# Patient Record
Sex: FEMALE | Race: WHITE | Employment: FULL TIME | URBAN - METROPOLITAN AREA
[De-identification: names, ages, dates, MRNs, and addresses within clinical notes are randomized per-mention and may not be internally consistent; named-entity substitution may affect disease eponyms.]

---
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
## 2021-04-16 ENCOUNTER — HOSPITAL ENCOUNTER (EMERGENCY)
Age: 21
Discharge: HOME | End: 2021-04-17
Attending: EMERGENCY MEDICINE

## 2021-04-16 DIAGNOSIS — S61.412A LACERATION OF LEFT HAND WITHOUT FOREIGN BODY, INITIAL ENCOUNTER: Primary | ICD-10-CM

## 2021-04-16 PROCEDURE — 90471 IMMUNIZATION ADMIN: CPT

## 2021-04-16 PROCEDURE — 99282 EMERGENCY DEPT VISIT SF MDM: CPT

## 2021-04-16 PROCEDURE — 75810000293 HC SIMP/SUPERF WND  RPR

## 2021-04-16 NOTE — LETTER
[address] [address] EMERGENCY DEPT 
[address] [facility] 
[place] [id number] [phone] Work/School Note Date: 4/16/2021 To Whom It May concern: 
 
Mati Goddard was seen and treated today in the emergency room by the following provider(s): 
Attending Provider: Lynn Lopez DO. Mati Goddard is excused from work/school on 04/17/21 and 04/18/21. She is medically clear to return to work/school on 4/19/2021. Sincerely, Ashlyn Lugo RN

## 2021-04-17 ENCOUNTER — APPOINTMENT (OUTPATIENT)
Dept: GENERAL RADIOLOGY | Age: 21
End: 2021-04-17
Attending: EMERGENCY MEDICINE

## 2021-04-17 VITALS
BODY MASS INDEX: 34.38 KG/M2 | OXYGEN SATURATION: 100 % | DIASTOLIC BLOOD PRESSURE: 66 MMHG | HEIGHT: 63 IN | HEART RATE: 89 BPM | TEMPERATURE: 98.8 F | RESPIRATION RATE: 16 BRPM | WEIGHT: 194 LBS | SYSTOLIC BLOOD PRESSURE: 123 MMHG

## 2021-04-17 PROCEDURE — 73130 X-RAY EXAM OF HAND: CPT

## 2021-04-17 PROCEDURE — 90715 TDAP VACCINE 7 YRS/> IM: CPT | Performed by: EMERGENCY MEDICINE

## 2021-04-17 PROCEDURE — 75810000293 HC SIMP/SUPERF WND  RPR

## 2021-04-17 PROCEDURE — 74011250636 HC RX REV CODE- 250/636: Performed by: EMERGENCY MEDICINE

## 2021-04-17 RX ADMIN — TETANUS TOXOID, REDUCED DIPHTHERIA TOXOID AND ACELLULAR PERTUSSIS VACCINE, ADSORBED 0.5 ML: 5; 2.5; 8; 8; 2.5 SUSPENSION INTRAMUSCULAR at 00:35

## 2021-04-17 NOTE — DISCHARGE INSTRUCTIONS
Do not apply any antibiotic ointment to the areas where the tissue glue was placed. Try to keep the tissue glue in place for the next week if possible.

## 2021-04-17 NOTE — ED TRIAGE NOTES
Pt to the Ed from home with a multiple lacerations on her left hand. Pt states that she was washing dishes and the glass broke on her hand. Pt masked prior to arrival to the ED.

## 2021-04-17 NOTE — ED NOTES
I have reviewed discharge instructions with the patient. The patient verbalized understanding. Patient left ED via Discharge Method: ambulatory to Home with family. Opportunity for questions and clarification provided. Patient given 0 scripts. To continue your aftercare when you leave the hospital, you may receive an automated call from our care team to check in on how you are doing. This is a free service and part of our promise to provide the best care and service to meet your aftercare needs.  If you have questions, or wish to unsubscribe from this service please call [phone]. Thank you for Choosing our [facility] Emergency Department.

## 2021-04-17 NOTE — ED PROVIDER NOTES
Patient is a 15-year-old female presenting to the emergency department today complaining of lacerations to the left hand. The patient states she was cleaning a glass when the glass broke causing 4 different lacerations to the hand. The patient notes that her immunizations are up-to-date she got tetanus last year. The patient has not had any loss of sensation distal to the injuries. History reviewed. No pertinent past medical history. History reviewed. No pertinent surgical history. History reviewed. No pertinent family history. Social History     Socioeconomic History    Marital status: SINGLE     Spouse name: Not on file    Number of children: Not on file    Years of education: Not on file    Highest education level: Not on file   Occupational History    Not on file   Social Needs    Financial resource strain: Not on file    Food insecurity     Worry: Not on file     Inability: Not on file    Transportation needs     Medical: Not on file     Non-medical: Not on file   Tobacco Use    Smoking status: Never Smoker   Substance and Sexual Activity    Alcohol use: No    Drug use: No    Sexual activity: Never     Birth control/protection: None   Lifestyle    Physical activity     Days per week: Not on file     Minutes per session: Not on file    Stress: Not on file   Relationships    Social connections     Talks on phone: Not on file     Gets together: Not on file     Attends Congregation service: Not on file     Active member of club or organization: Not on file     Attends meetings of clubs or organizations: Not on file     Relationship status: Not on file    Intimate partner violence     Fear of current or ex partner: Not on file     Emotionally abused: Not on file     Physically abused: Not on file     Forced sexual activity: Not on file   Other Topics Concern    Not on file   Social History Narrative    Not on file         ALLERGIES: Patient has no known allergies.     Review of Systems   Constitutional: Negative. Musculoskeletal: Negative. Skin: Positive for wound. Neurological: Negative. Hematological: Negative. Vitals:    04/16/21 2341 04/17/21 0047   BP: 123/66    Pulse: 89    Resp: 16    Temp: 98.8 °F (37.1 °C)    SpO2: 100% 100%   Weight: 88 kg (194 lb)    Height: 5' 3\" (1.6 m)             Physical Exam     GENERAL:The patient has Body mass index is 34.37 kg/m². Well-hydrated. No acute distress. VITAL SIGNS: Heart rate, blood pressure, respiratory rate reviewed as recorded in  nurse's notes  EYES: Pupils reactive. Extraocular motion intact. No conjunctival redness or drainage. LUNGS: No accessory muscle use  CARDIOVASCULAR: Regular rate and rhythm  EXTREMITIES: Pt moving all 4 extremities with out limitations. Normal muscle tone. NEUROLOGIC: Cranial nerve exam reveals face is symmetrical, tongue is midline  speech is clear. No focal deficits noted  SKIN: No rash or petechiae. Good skin turgor palpated. 4 superficial lacerations to the left hand as shown in the photos. PSYCHIATRIC: Alert and oriented. Appropriate behavior and judgment. MDM  Number of Diagnoses or Management Options  Diagnosis management comments: Sprain, strain, tendon injury, contusion,    Abrasion, laceration, neurovascular injury, foreign body    Fracture, open fracture, dislocation, joint separation, articular surface injury,       Amount and/or Complexity of Data Reviewed  Tests in the radiology section of CPT®: ordered and reviewed  Tests in the medicine section of CPT®: ordered and reviewed  Decide to obtain previous medical records or to obtain history from someone other than the patient: yes  Independent visualization of images, tracings, or specimens: yes      ED Course as of Apr 17 0112   Sat Apr 17, 2021   0005 I talked to the patient about suture closure of 1 laceration which is mildly gaping about 3 mm.   The patient is declining sutures and wants to have Mastisol and Steri-Strips. She also wants to have the Dermabond for the rest of the superficial lacerations. []   0045 IMPRESSION     1. No radiopaque foreign body.     2. No acute fracture. XR HAND LT MIN 3 V [KH]      ED Course User Index  [CARMEN] Hudson Sidhu DO       Wound Closure by Adhesive    Date/Time: 4/17/2021 1:10 AM  Performed by: Hudson Sidhu DO  Authorized by: Hudson Sidhu DO     Consent:     Consent obtained:  Verbal    Consent given by:  Patient    Risks discussed:  Pain, infection, need for additional repair, poor wound healing and retained foreign body    Alternatives discussed:  No treatment and observation  Anesthesia (see MAR for exact dosages): Anesthesia method:  None  Laceration details:     Location:  Hand    Hand location:  L hand, dorsum    Length (cm):  6  Repair type:     Repair type:  Simple  Pre-procedure details:     Preparation:  Patient was prepped and draped in usual sterile fashion  Exploration:     Hemostasis achieved with:  Direct pressure    Contaminated: no    Treatment:     Area cleansed with:  Hibiclens    Amount of cleaning:  Standard    Visualized foreign bodies/material removed: no    Skin repair:     Repair method:  Tissue adhesive  Approximation:     Approximation:  Loose  Post-procedure details:     Patient tolerance of procedure:   Tolerated well, no immediate complications